# Patient Record
Sex: MALE | Race: WHITE | NOT HISPANIC OR LATINO | URBAN - METROPOLITAN AREA
[De-identification: names, ages, dates, MRNs, and addresses within clinical notes are randomized per-mention and may not be internally consistent; named-entity substitution may affect disease eponyms.]

---

## 2024-04-03 ENCOUNTER — OV NP (OUTPATIENT)
Dept: URBAN - METROPOLITAN AREA CLINIC 60 | Facility: CLINIC | Age: 64
End: 2024-04-03
Payer: MEDICARE

## 2024-04-03 ENCOUNTER — LAB (OUTPATIENT)
Dept: URBAN - METROPOLITAN AREA CLINIC 60 | Facility: CLINIC | Age: 64
End: 2024-04-03

## 2024-04-03 VITALS
BODY MASS INDEX: 27.13 KG/M2 | RESPIRATION RATE: 12 BRPM | DIASTOLIC BLOOD PRESSURE: 76 MMHG | SYSTOLIC BLOOD PRESSURE: 142 MMHG | HEIGHT: 68 IN | WEIGHT: 179 LBS | HEART RATE: 65 BPM | OXYGEN SATURATION: 96 % | TEMPERATURE: 98.7 F

## 2024-04-03 DIAGNOSIS — K74.69 OTHER CIRRHOSIS OF LIVER: ICD-10-CM

## 2024-04-03 DIAGNOSIS — E87.1 HYPONATREMIA: ICD-10-CM

## 2024-04-03 DIAGNOSIS — I48.91 ATRIAL FIBRILLATION, UNSPECIFIED TYPE: ICD-10-CM

## 2024-04-03 DIAGNOSIS — K59.09 CHRONIC CONSTIPATION: ICD-10-CM

## 2024-04-03 DIAGNOSIS — R14.0 ABDOMINAL DISTENTION: ICD-10-CM

## 2024-04-03 DIAGNOSIS — Z86.010 PERSONAL HISTORY OF COLONIC POLYPS: ICD-10-CM

## 2024-04-03 DIAGNOSIS — R10.13 EPIGASTRIC PAIN: ICD-10-CM

## 2024-04-03 PROBLEM — 49436004: Status: ACTIVE | Noted: 2024-04-03

## 2024-04-03 PROBLEM — 19943007: Status: ACTIVE | Noted: 2024-04-03

## 2024-04-03 PROBLEM — 428283002: Status: ACTIVE | Noted: 2024-04-03

## 2024-04-03 PROCEDURE — 99204 OFFICE O/P NEW MOD 45 MIN: CPT | Performed by: PHYSICIAN ASSISTANT

## 2024-04-03 RX ORDER — ATORVASTATIN CALCIUM 40 MG/1
1 TABLET TABLET, FILM COATED ORAL ONCE A DAY
Status: ACTIVE | COMMUNITY

## 2024-04-03 RX ORDER — OMEPRAZOLE 40 MG/1
1 CAPSULE 30 MINUTES BEFORE MORNING MEAL CAPSULE, DELAYED RELEASE ORAL ONCE A DAY
Qty: 30 | Refills: 5 | OUTPATIENT
Start: 2024-04-03

## 2024-04-03 RX ORDER — OMEPRAZOLE 20 MG/1
TAKE 1 CAPSULE BY MOUTH EVERY DAY CAPSULE, DELAYED RELEASE ORAL
Qty: 90 EACH | Refills: 0 | Status: ACTIVE | COMMUNITY

## 2024-04-03 RX ORDER — BUPROPION HYDROCHLORIDE 300 MG/1
TAKE 1 TABLET DAILY TABLET, EXTENDED RELEASE ORAL
Qty: 30 EACH | Refills: 3 | Status: ACTIVE | COMMUNITY

## 2024-04-03 RX ORDER — LACTULOSE 10 G/15ML
15 ML AS NEEDED SOLUTION ORAL TWICE A DAY
Qty: 900 ML | Refills: 11 | OUTPATIENT
Start: 2024-04-03 | End: 2025-03-29

## 2024-04-03 RX ORDER — AMLODIPINE BESYLATE 10 MG/1
TAKE 1 TABLET BY MOUTH EVERYDAY AT BEDTIME TABLET ORAL
Qty: 90 EACH | Refills: 2 | Status: ACTIVE | COMMUNITY

## 2024-04-03 RX ORDER — METOPROLOL TARTRATE 50 MG/1
1 TABLET WITH FOOD TABLET, FILM COATED ORAL TWICE A DAY
Status: ACTIVE | COMMUNITY

## 2024-04-03 RX ORDER — AMIODARONE HCL 200 MG
1 TABLET TABLET ORAL ONCE A DAY
Status: ACTIVE | COMMUNITY

## 2024-04-03 RX ORDER — LISINOPRIL 10 MG/1
1 TABLET TABLET ORAL ONCE A DAY
Status: ACTIVE | COMMUNITY

## 2024-04-03 NOTE — HPI-TODAY'S VISIT:
64-year-old male with atrial fibrillation, hypertension, hyperlipidemia, alcoholic cirrhosis with history of ascites, chronic hyponatremia presents to the office for evaluation of abdominal pain, distention and constipation.   He was recently hospitalized at [facility] from February 16 through February 20, 2024.  He presented to [facility] emergency department complaining of chest pain.  He reported having chest pain in the past and that he was recently taken off Eliquis.  His workup in the ER demonstrated hyponatremia with a sodium level of 117 and he was transferred to the [facility].  CTA of the chest was performed which was negative for PE.  He was evaluated by nephrology and his hyponatremia was thought to be secondary to decreased p.o. intake with increased free water consumption compounded by his use of HCTZ and spironolactone along with his alcohol use disorder.  He was maintained on sodium chloride 2 g 3 times a day.  His hydrochlorothiazide and spironolactone were discontinued and it was recommended that these not be resumed on discharge.  Abdominal sonogram was done to survey for ascites on February 19, 2024 which demonstrated no appreciable ascites.  Labs done February 19, 2024 demonstrated a normal CBC with hemoglobin 13.7 and platelet count 211.  His LFTs were normal with the exception of a slightly elevated total bilirubin of 1.2.  Serum sodium was 127.  Last paracentesis was done August 26, 2023.  2.15 L of fluid was removed. CT abdomen/pelvis without contrast on August 26, 2023 demonstrated fatty infiltration of the liver with mild upper abdominal ascites. EGD with [facility] 8/28/2023 to assess anorexia, poor oral intake, unexplained weight loss, and cirrhosis: Normal esophagus.  Mid sized, 4 cm, Hill grade 4 hiatal hernia.  No obvious esophageal or gastric varices.  Mild portal hypertensive gastropathy.  Normal duodenum.  Colonoscopy 8/28/2023 with [facility] to assess change in bowel habits and weight loss.  An 18 mm tubular adenoma was removed from the ascending colon.  A 10 mm tubular adenoma was removed from the ascending colon.  A 4 mm tubular adenoma was removed from the tract proximal transverse colon.  A 15 mm tubular adenoma was removed from the proximal transverse colon.  A 12 mm tubular adenoma was removed from the distal transverse colon.  A 20 mm tubular adenoma was removed from the distal transverse colon.  A 15 mm tubular adenoma was removed from the sigmoid colon.  Additional findings included moderate pandiverticulosis and moderate-sized internal hemorrhoids.  Repeat colonoscopy was recommended in 3 years.  He states he was referred by PCP for abdominal pain, bloating and constipation. He has abdominal bloating and distention after meals. No records from PCP for review. Appetite is good but he cannot eat much due to bloating. He reports occasional nausea but no vomiting. He has a BM once every 5-6 days. He denies any hematochezia or melena. When he feels constipated, he will use MOM and will eventually have a BM after several hours. No other GI complaints.   He stopped drinking alcohol 5-6 months ago. He used to drink 6 beers per day. He has been drinking since age 20 but started drinking a lot more after he retired. He moved to [place] from [place] 2 years ago. Does not have a cardiologist in [place]. Never followed up with nephrology after discharge.   He has no family history of liver disease or GI cancers.

## 2024-04-19 LAB
A/G RATIO: 1.4
ACTIN (SMOOTH MUSCLE) ANTIBODY: 7
AFP, SERUM, TUMOR MARKER: 2.5
ALBUMIN: 4.1
ALKALINE PHOSPHATASE: 109
ALPHA-1-ANTITRYPSIN, SERUM: 168
ALT (SGPT): 27
ANA DIRECT: NEGATIVE
AST (SGOT): 22
BILIRUBIN, TOTAL: 1.6
BUN/CREATININE RATIO: 11
BUN: 9
CALCIUM: 9.8
CARBON DIOXIDE, TOTAL: 21
CHLORIDE: 102
CREATININE: 0.83
EGFR: 97
FERRITIN, SERUM: 416
GLOBULIN, TOTAL: 2.9
GLUCOSE: 90
HBSAG SCREEN: NEGATIVE
HCV AB: NON REACTIVE
HEMATOCRIT: 38.2
HEMOGLOBIN: 13.2
HEP A AB, IGM: NEGATIVE
HEP B CORE AB, IGM: NEGATIVE
INR: 1.1
INTERPRETATION:: (no result)
IRON BIND.CAP.(TIBC): 283
IRON SATURATION: 41
IRON: 116
MCH: 31.2
MCHC: 34.6
MCV: 90
MITOCHONDRIAL (M2) ANTIBODY: <20
NRBC: (no result)
PLATELETS: 311
POTASSIUM: 4.2
PROTEIN, TOTAL: 7
PROTHROMBIN TIME: 11.4
RBC: 4.23
RDW: 12.8
SODIUM: 138
UIBC: 167
WBC: 7

## 2024-05-22 ENCOUNTER — OFFICE VISIT (OUTPATIENT)
Dept: URBAN - METROPOLITAN AREA CLINIC 60 | Facility: CLINIC | Age: 64
End: 2024-05-22
Payer: MEDICARE

## 2024-05-22 ENCOUNTER — LAB OUTSIDE AN ENCOUNTER (OUTPATIENT)
Dept: URBAN - METROPOLITAN AREA CLINIC 60 | Facility: CLINIC | Age: 64
End: 2024-05-22

## 2024-05-22 ENCOUNTER — DASHBOARD ENCOUNTERS (OUTPATIENT)
Age: 64
End: 2024-05-22

## 2024-05-22 VITALS
HEART RATE: 60 BPM | DIASTOLIC BLOOD PRESSURE: 68 MMHG | RESPIRATION RATE: 12 BRPM | WEIGHT: 179.6 LBS | OXYGEN SATURATION: 95 % | SYSTOLIC BLOOD PRESSURE: 140 MMHG | BODY MASS INDEX: 27.22 KG/M2 | HEIGHT: 68 IN

## 2024-05-22 DIAGNOSIS — E87.1 HYPONATREMIA: ICD-10-CM

## 2024-05-22 DIAGNOSIS — R10.13 EPIGASTRIC PAIN: ICD-10-CM

## 2024-05-22 DIAGNOSIS — I48.91 ATRIAL FIBRILLATION, UNSPECIFIED TYPE: ICD-10-CM

## 2024-05-22 DIAGNOSIS — K74.69 OTHER CIRRHOSIS OF LIVER: ICD-10-CM

## 2024-05-22 PROCEDURE — 99214 OFFICE O/P EST MOD 30 MIN: CPT | Performed by: PHYSICIAN ASSISTANT

## 2024-05-22 RX ORDER — ATORVASTATIN CALCIUM 40 MG/1
1 TABLET TABLET, FILM COATED ORAL ONCE A DAY
Status: ACTIVE | COMMUNITY

## 2024-05-22 RX ORDER — OMEPRAZOLE 40 MG/1
1 CAPSULE 30 MINUTES BEFORE MORNING MEAL CAPSULE, DELAYED RELEASE ORAL ONCE A DAY
OUTPATIENT
Start: 2024-04-03

## 2024-05-22 RX ORDER — WARFARIN SODIUM 5 MG/1
1 TABLET TABLET ORAL ONCE A DAY
Qty: 30 | Status: ACTIVE | COMMUNITY
Start: 2024-05-22

## 2024-05-22 RX ORDER — AMLODIPINE BESYLATE 10 MG/1
TAKE 1 TABLET BY MOUTH EVERYDAY AT BEDTIME TABLET ORAL
Qty: 90 EACH | Refills: 2 | Status: ACTIVE | COMMUNITY

## 2024-05-22 RX ORDER — BUPROPION HYDROCHLORIDE 300 MG/1
TAKE 1 TABLET DAILY TABLET, EXTENDED RELEASE ORAL
Qty: 30 EACH | Refills: 3 | Status: ACTIVE | COMMUNITY

## 2024-05-22 RX ORDER — LACTULOSE 10 G/15ML
15 ML AS NEEDED SOLUTION ORAL TWICE A DAY
Qty: 900 ML | Refills: 11 | Status: ACTIVE | COMMUNITY
Start: 2024-04-03 | End: 2025-03-29

## 2024-05-22 RX ORDER — LACTULOSE 10 G/15ML
15 ML AS NEEDED SOLUTION ORAL TWICE A DAY
Qty: 900 ML | Refills: 11 | OUTPATIENT

## 2024-05-22 RX ORDER — LISINOPRIL 10 MG/1
1 TABLET TABLET ORAL ONCE A DAY
Status: ACTIVE | COMMUNITY

## 2024-05-22 RX ORDER — METOPROLOL TARTRATE 50 MG/1
1 TABLET WITH FOOD TABLET, FILM COATED ORAL TWICE A DAY
Status: ACTIVE | COMMUNITY

## 2024-05-22 RX ORDER — AMIODARONE HCL 200 MG
1 TABLET TABLET ORAL ONCE A DAY
Status: ACTIVE | COMMUNITY

## 2024-05-22 RX ORDER — OMEPRAZOLE 40 MG/1
1 CAPSULE 30 MINUTES BEFORE MORNING MEAL CAPSULE, DELAYED RELEASE ORAL ONCE A DAY
Qty: 30 | Refills: 5 | Status: ACTIVE | COMMUNITY
Start: 2024-04-03

## 2024-07-30 ENCOUNTER — LAB OUTSIDE AN ENCOUNTER (OUTPATIENT)
Dept: URBAN - METROPOLITAN AREA CLINIC 63 | Facility: CLINIC | Age: 64
End: 2024-07-30

## 2024-08-22 ENCOUNTER — LAB OUTSIDE AN ENCOUNTER (OUTPATIENT)
Dept: URBAN - METROPOLITAN AREA CLINIC 60 | Facility: CLINIC | Age: 64
End: 2024-08-22